# Patient Record
Sex: MALE | Race: BLACK OR AFRICAN AMERICAN | NOT HISPANIC OR LATINO | Employment: FULL TIME | URBAN - METROPOLITAN AREA
[De-identification: names, ages, dates, MRNs, and addresses within clinical notes are randomized per-mention and may not be internally consistent; named-entity substitution may affect disease eponyms.]

---

## 2020-07-19 ENCOUNTER — HOSPITAL ENCOUNTER (EMERGENCY)
Facility: HOSPITAL | Age: 25
Discharge: HOME | End: 2020-07-19
Attending: EMERGENCY MEDICINE | Admitting: EMERGENCY MEDICINE

## 2020-07-19 VITALS
RESPIRATION RATE: 14 BRPM | HEIGHT: 73 IN | BODY MASS INDEX: 27.17 KG/M2 | WEIGHT: 205.03 LBS | DIASTOLIC BLOOD PRESSURE: 76 MMHG | SYSTOLIC BLOOD PRESSURE: 113 MMHG | HEART RATE: 90 BPM | TEMPERATURE: 98 F | OXYGEN SATURATION: 95 %

## 2020-07-19 DIAGNOSIS — R00.2 PALPITATIONS: Primary | ICD-10-CM

## 2020-07-19 LAB
ANION GAP SERPL CALCULATED.3IONS-SCNC: 14 MMOL/L (ref 5–15)
BASOPHILS # BLD AUTO: 0 10*3/MM3 (ref 0–0.2)
BASOPHILS NFR BLD AUTO: 0.2 % (ref 0–1.5)
BILIRUB UR QL STRIP: NEGATIVE
BUN SERPL-MCNC: 14 MG/DL (ref 6–20)
BUN SERPL-MCNC: NORMAL MG/DL
BUN/CREAT SERPL: NORMAL
CALCIUM SPEC-SCNC: 9.1 MG/DL (ref 8.6–10.5)
CHLORIDE SERPL-SCNC: 102 MMOL/L (ref 98–107)
CLARITY UR: CLEAR
CO2 SERPL-SCNC: 24 MMOL/L (ref 22–29)
COLOR UR: YELLOW
CREAT SERPL-MCNC: 1.21 MG/DL (ref 0.76–1.27)
DEPRECATED RDW RBC AUTO: 38.1 FL (ref 37–54)
EOSINOPHIL # BLD AUTO: 0.1 10*3/MM3 (ref 0–0.4)
EOSINOPHIL NFR BLD AUTO: 0.7 % (ref 0.3–6.2)
ERYTHROCYTE [DISTWIDTH] IN BLOOD BY AUTOMATED COUNT: 12.6 % (ref 12.3–15.4)
GFR SERPL CREATININE-BSD FRML MDRD: 89 ML/MIN/1.73
GLUCOSE SERPL-MCNC: 91 MG/DL (ref 65–99)
GLUCOSE UR STRIP-MCNC: NEGATIVE MG/DL
HCT VFR BLD AUTO: 44.6 % (ref 37.5–51)
HGB BLD-MCNC: 14.8 G/DL (ref 13–17.7)
HGB UR QL STRIP.AUTO: NEGATIVE
KETONES UR QL STRIP: NEGATIVE
LEUKOCYTE ESTERASE UR QL STRIP.AUTO: NEGATIVE
LYMPHOCYTES # BLD AUTO: 1.5 10*3/MM3 (ref 0.7–3.1)
LYMPHOCYTES NFR BLD AUTO: 16.2 % (ref 19.6–45.3)
MAGNESIUM SERPL-MCNC: 1.8 MG/DL (ref 1.6–2.6)
MCH RBC QN AUTO: 28.4 PG (ref 26.6–33)
MCHC RBC AUTO-ENTMCNC: 33.1 G/DL (ref 31.5–35.7)
MCV RBC AUTO: 86 FL (ref 79–97)
MONOCYTES # BLD AUTO: 0.6 10*3/MM3 (ref 0.1–0.9)
MONOCYTES NFR BLD AUTO: 6.1 % (ref 5–12)
NEUTROPHILS NFR BLD AUTO: 7.2 10*3/MM3 (ref 1.7–7)
NEUTROPHILS NFR BLD AUTO: 76.8 % (ref 42.7–76)
NITRITE UR QL STRIP: NEGATIVE
NRBC BLD AUTO-RTO: 0.1 /100 WBC (ref 0–0.2)
PH UR STRIP.AUTO: 7 [PH] (ref 5–8)
PLATELET # BLD AUTO: 210 10*3/MM3 (ref 140–450)
PMV BLD AUTO: 8.6 FL (ref 6–12)
POTASSIUM SERPL-SCNC: 3.9 MMOL/L (ref 3.5–5.2)
PROT UR QL STRIP: NEGATIVE
RBC # BLD AUTO: 5.19 10*6/MM3 (ref 4.14–5.8)
SODIUM SERPL-SCNC: 140 MMOL/L (ref 136–145)
SP GR UR STRIP: <=1.005 (ref 1–1.03)
TROPONIN T SERPL-MCNC: <0.01 NG/ML (ref 0–0.03)
TSH SERPL DL<=0.05 MIU/L-ACNC: 1.32 UIU/ML (ref 0.27–4.2)
UROBILINOGEN UR QL STRIP: NORMAL
WBC # BLD AUTO: 9.4 10*3/MM3 (ref 3.4–10.8)

## 2020-07-19 PROCEDURE — 84484 ASSAY OF TROPONIN QUANT: CPT | Performed by: EMERGENCY MEDICINE

## 2020-07-19 PROCEDURE — 81003 URINALYSIS AUTO W/O SCOPE: CPT | Performed by: EMERGENCY MEDICINE

## 2020-07-19 PROCEDURE — 80048 BASIC METABOLIC PNL TOTAL CA: CPT | Performed by: EMERGENCY MEDICINE

## 2020-07-19 PROCEDURE — 93005 ELECTROCARDIOGRAM TRACING: CPT | Performed by: EMERGENCY MEDICINE

## 2020-07-19 PROCEDURE — 85025 COMPLETE CBC W/AUTO DIFF WBC: CPT | Performed by: EMERGENCY MEDICINE

## 2020-07-19 PROCEDURE — 83735 ASSAY OF MAGNESIUM: CPT | Performed by: EMERGENCY MEDICINE

## 2020-07-19 PROCEDURE — 84443 ASSAY THYROID STIM HORMONE: CPT | Performed by: EMERGENCY MEDICINE

## 2020-07-19 PROCEDURE — 99284 EMERGENCY DEPT VISIT MOD MDM: CPT

## 2020-07-19 NOTE — ED PROVIDER NOTES
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  These are nonexertional in nature.  Recurrence tonight briefly at work, subsequently resolved.  Patient works at [employer].  Patient denies any recent illness, no stimulant use or recreational drug use.  No associated chest pain or shortness of air.  Patient runs 3 to 4 miles several days a week without any symptoms.  Patient is followed in [place] by a cardiologist and has annual EKGs as well as echocardiograms and there has been no complications.          Review of Systems   Cardiovascular: Positive for palpitations.   Neurological: Positive for light-headedness.   Psychiatric/Behavioral: The patient is nervous/anxious.    All other systems reviewed and are negative.      No past medical history on file.    No Known Allergies    No past surgical history on file.    No family history on file.    Social History     Socioeconomic History   • Marital status: Single     Spouse name: Not on file   • Number of children: Not on file   • Years of education: Not on file   • Highest education level: Not on file           Objective   Physical Exam   Constitutional: He is oriented to person, place, and time. He appears well-developed and well-nourished.   HENT:   Head: Normocephalic and atraumatic.   Mouth/Throat: Oropharynx is clear and moist.   Eyes: Pupils are equal, round, and reactive to light. Conjunctivae are normal.   Neck: Normal range of motion. Neck supple.   Cardiovascular: Normal heart sounds and intact distal pulses.   Tachycardic, no murmur   Pulmonary/Chest: Effort normal and breath sounds normal.   Abdominal: Soft. Bowel sounds are normal. He exhibits no distension. There is no tenderness.   Musculoskeletal: Normal range of motion. He exhibits no edema or tenderness.   Neurological: He is alert and oriented to person, place, and time.   Skin:  Skin is warm and dry. Capillary refill takes less than 2 seconds.   Psychiatric:   Mildly anxious, otherwise appropriate and pleasant       Procedures           ED Course  ED Course as of Jul 19 0307   Sun Jul 19, 2020   0125 EKG interpretation: Sinus tachycardia, rate 104, nonspecific T wave inversion seen    [JR]      ED Course User Index  [JR] Mansoor Mendez MD                                           MDM  Number of Diagnoses or Management Options  Palpitations:   Diagnosis management comments: Results for orders placed or performed during the hospital encounter of 07/19/20  -Troponin       Result                      Value             Ref Range           Troponin T                  <0.010            0.000 - 0.03*  -Urinalysis With Microscopic If Indicated (No Culture) - Urine, Clean Catch       Result                      Value             Ref Range           Color, UA                   Yellow            Yellow, Straw       Appearance, UA              Clear             Clear               pH, UA                      7.0               5.0 - 8.0           Specific Gravity, UA        <=1.005           1.005 - 1.030       Glucose, UA                 Negative          Negative            Ketones, UA                 Negative          Negative            Bilirubin, UA               Negative          Negative            Blood, UA                   Negative          Negative            Protein, UA                 Negative          Negative            Leuk Esterase, UA           Negative          Negative            Nitrite, UA                 Negative          Negative            Urobilinogen, UA            0.2 E.U./dL       0.2 - 1.0 E.*  -Magnesium       Result                      Value             Ref Range           Magnesium                   1.8               1.6 - 2.6 mg*  -TSH       Result                      Value             Ref Range           TSH                         1.320             0.270 -  4.20*  -Basic Metabolic Panel       Result                      Value             Ref Range           Glucose                     91                65 - 99 mg/dL       BUN                                                               Creatinine                  1.21              0.76 - 1.27 *       Sodium                      140               136 - 145 mm*       Potassium                   3.9               3.5 - 5.2 mm*       Chloride                    102               98 - 107 mmo*       CO2                         24.0              22.0 - 29.0 *       Calcium                     9.1               8.6 - 10.5 m*       eGFR   Amer          89                >60 mL/min/1*       BUN/Creatinine Ratio                                              Anion Gap                   14.0              5.0 - 15.0 m*  -CBC Auto Differential       Result                      Value             Ref Range           WBC                         9.40              3.40 - 10.80*       RBC                         5.19              4.14 - 5.80 *       Hemoglobin                  14.8              13.0 - 17.7 *       Hematocrit                  44.6              37.5 - 51.0 %       MCV                         86.0              79.0 - 97.0 *       MCH                         28.4              26.6 - 33.0 *       MCHC                        33.1              31.5 - 35.7 *       RDW                         12.6              12.3 - 15.4 %       RDW-SD                      38.1              37.0 - 54.0 *       MPV                         8.6               6.0 - 12.0 fL       Platelets                   210               140 - 450 10*       Neutrophil %                76.8 (H)          42.7 - 76.0 %       Lymphocyte %                16.2 (L)          19.6 - 45.3 %       Monocyte %                  6.1               5.0 - 12.0 %        Eosinophil %                0.7               0.3 - 6.2 %         Basophil %                  0.2                0.0 - 1.5 %         Neutrophils, Absolute       7.20 (H)          1.70 - 7.00 *       Lymphocytes, Absolute       1.50              0.70 - 3.10 *       Monocytes, Absolute         0.60              0.10 - 0.90 *       Eosinophils, Absolute       0.10              0.00 - 0.40 *       Basophils, Absolute         0.00              0.00 - 0.20 *       nRBC                        0.1               0.0 - 0.2 /1*  -BUN       Result                      Value             Ref Range           BUN                         14                6 - 20 mg/dL     Well, vital signs stable, asymptomatic at present.  Given cardiac history, will place Holter monitor, patient has cardiologist to follow-up with after completed.       Amount and/or Complexity of Data Reviewed  Clinical lab tests: reviewed        Final diagnoses:   Palpitations            Mansoor Mendez MD  07/19/20 7985

## 2020-07-20 ENCOUNTER — HOSPITAL ENCOUNTER (OUTPATIENT)
Dept: RESPIRATORY THERAPY | Facility: HOSPITAL | Age: 25
Discharge: HOME | End: 2020-07-20
Admitting: EMERGENCY MEDICINE

## 2020-07-20 DIAGNOSIS — R00.2 PALPITATIONS: ICD-10-CM

## 2020-07-20 PROCEDURE — 93225 XTRNL ECG REC<48 HRS REC: CPT

## 2020-07-21 PROCEDURE — 93227 XTRNL ECG REC<48 HR R&I: CPT | Performed by: INTERNAL MEDICINE

## 2020-07-30 ENCOUNTER — OFFICE VISIT (OUTPATIENT)
Dept: CARDIOLOGY | Facility: CLINIC | Age: 25
End: 2020-07-30

## 2020-07-30 VITALS
BODY MASS INDEX: 26.91 KG/M2 | OXYGEN SATURATION: 98 % | HEART RATE: 82 BPM | DIASTOLIC BLOOD PRESSURE: 77 MMHG | SYSTOLIC BLOOD PRESSURE: 148 MMHG | WEIGHT: 204 LBS

## 2020-07-30 DIAGNOSIS — F41.8 ANXIETY ABOUT HEALTH: ICD-10-CM

## 2020-07-30 DIAGNOSIS — Z87.74 H/O REPAIR OF PATENT DUCTUS ARTERIOSUS: ICD-10-CM

## 2020-07-30 DIAGNOSIS — I49.3 SYMPTOMATIC PVCS: Primary | ICD-10-CM

## 2020-07-30 DIAGNOSIS — Q25.1 COARCTATION OF AORTA: ICD-10-CM

## 2020-07-30 PROCEDURE — 99203 OFFICE O/P NEW LOW 30 MIN: CPT | Performed by: NURSE PRACTITIONER

## 2020-08-03 PROBLEM — Q25.1 COARCTATION OF AORTA: Status: ACTIVE | Noted: 2020-08-03

## 2020-08-03 PROBLEM — Z87.74 H/O REPAIR OF PATENT DUCTUS ARTERIOSUS: Status: ACTIVE | Noted: 2020-08-03

## 2020-08-03 PROBLEM — F41.8 ANXIETY ABOUT HEALTH: Status: ACTIVE | Noted: 2020-08-03

## 2020-08-03 PROBLEM — I49.3 SYMPTOMATIC PVCS: Status: ACTIVE | Noted: 2020-08-03

## 2020-08-04 ENCOUNTER — APPOINTMENT (OUTPATIENT)
Dept: CARDIOLOGY | Facility: HOSPITAL | Age: 25
End: 2020-08-04

## 2020-08-05 ENCOUNTER — HOSPITAL ENCOUNTER (OUTPATIENT)
Dept: CARDIOLOGY | Facility: HOSPITAL | Age: 25
Discharge: HOME | End: 2020-08-05
Admitting: NURSE PRACTITIONER

## 2020-08-05 DIAGNOSIS — Q25.1 COARCTATION OF AORTA: ICD-10-CM

## 2020-08-05 DIAGNOSIS — I49.3 SYMPTOMATIC PVCS: ICD-10-CM

## 2020-08-05 PROCEDURE — 93016 CV STRESS TEST SUPVJ ONLY: CPT | Performed by: INTERNAL MEDICINE

## 2020-08-05 PROCEDURE — 93017 CV STRESS TEST TRACING ONLY: CPT

## 2020-08-06 ENCOUNTER — OFFICE VISIT (OUTPATIENT)
Dept: CARDIOLOGY | Facility: CLINIC | Age: 25
End: 2020-08-06

## 2020-08-06 DIAGNOSIS — I49.3 SYMPTOMATIC PVCS: ICD-10-CM

## 2020-08-06 DIAGNOSIS — F41.8 ANXIETY ABOUT HEALTH: ICD-10-CM

## 2020-08-06 DIAGNOSIS — Q25.1 COARCTATION OF AORTA: Primary | ICD-10-CM

## 2020-08-06 DIAGNOSIS — Z87.74 H/O REPAIR OF PATENT DUCTUS ARTERIOSUS: ICD-10-CM

## 2020-08-06 LAB
BH CV STRESS BP STAGE 1: NORMAL
BH CV STRESS BP STAGE 2: NORMAL
BH CV STRESS BP STAGE 3: NORMAL
BH CV STRESS BP STAGE 4: NORMAL
BH CV STRESS BP STAGE 5: NORMAL
BH CV STRESS DURATION MIN STAGE 1: 3
BH CV STRESS DURATION MIN STAGE 2: 3
BH CV STRESS DURATION MIN STAGE 3: 3
BH CV STRESS DURATION MIN STAGE 4: 3
BH CV STRESS DURATION MIN STAGE 5: 0
BH CV STRESS DURATION SEC STAGE 1: 0
BH CV STRESS DURATION SEC STAGE 2: 0
BH CV STRESS DURATION SEC STAGE 3: 0
BH CV STRESS DURATION SEC STAGE 4: 0
BH CV STRESS DURATION SEC STAGE 5: 53
BH CV STRESS GRADE STAGE 1: 10
BH CV STRESS GRADE STAGE 2: 12
BH CV STRESS GRADE STAGE 3: 14
BH CV STRESS GRADE STAGE 4: 16
BH CV STRESS GRADE STAGE 5: 18
BH CV STRESS HR STAGE 1: 116
BH CV STRESS HR STAGE 2: 153
BH CV STRESS HR STAGE 3: 178
BH CV STRESS HR STAGE 4: 186
BH CV STRESS HR STAGE 5: 188
BH CV STRESS METS STAGE 1: 5
BH CV STRESS METS STAGE 2: 7.5
BH CV STRESS METS STAGE 3: 10
BH CV STRESS METS STAGE 4: 13.5
BH CV STRESS METS STAGE 5: 14.8
BH CV STRESS PROTOCOL 1: NORMAL
BH CV STRESS RECOVERY BP: NORMAL MMHG
BH CV STRESS RECOVERY HR: 137 BPM
BH CV STRESS SPEED STAGE 1: 1.7
BH CV STRESS SPEED STAGE 2: 2.5
BH CV STRESS SPEED STAGE 3: 3.4
BH CV STRESS SPEED STAGE 4: 4.2
BH CV STRESS SPEED STAGE 5: 5
BH CV STRESS STAGE 1: 1
BH CV STRESS STAGE 2: 2
BH CV STRESS STAGE 3: 3
BH CV STRESS STAGE 4: 4
BH CV STRESS STAGE 5: 5
MAXIMAL PREDICTED HEART RATE: 195 BPM
PERCENT MAX PREDICTED HR: 96.41 %
STRESS BASELINE BP: NORMAL MMHG
STRESS BASELINE HR: 82 BPM
STRESS PERCENT HR: 113 %
STRESS POST ESTIMATED WORKLOAD: 14.8 METS
STRESS POST EXERCISE DUR MIN: 12 MIN
STRESS POST EXERCISE DUR SEC: 53 SEC
STRESS POST PEAK BP: NORMAL MMHG
STRESS POST PEAK HR: 188 BPM
STRESS TARGET HR: 166 BPM

## 2020-08-06 PROCEDURE — 99213 OFFICE O/P EST LOW 20 MIN: CPT | Performed by: NURSE PRACTITIONER

## 2020-08-06 PROCEDURE — 93018 CV STRESS TEST I&R ONLY: CPT | Performed by: INTERNAL MEDICINE

## 2020-08-06 RX ORDER — AMLODIPINE BESYLATE 2.5 MG/1
2.5 TABLET ORAL DAILY
Qty: 90 TABLET | Refills: 1 | Status: SHIPPED | OUTPATIENT
Start: 2020-08-06

## 2020-08-08 VITALS
DIASTOLIC BLOOD PRESSURE: 102 MMHG | SYSTOLIC BLOOD PRESSURE: 166 MMHG | BODY MASS INDEX: 26.52 KG/M2 | WEIGHT: 201 LBS | HEART RATE: 107 BPM

## 2020-08-08 NOTE — PROGRESS NOTES
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   Patient was seen in the emergency department 7/19/2020 and reports 1 year history of intermittent palpitations.   These occur on a daily basis, 1-2 episodes per day over the past 1.5-2 years.  Patient states it feels like \"an extra beat\".  He denies any associated symptoms such as chest pain, pressure, tightness, dizziness, lightheadedness, presyncopal or syncopal episodes.   The patient is very active and runs several times a week 3 to 4 miles with no symptoms.  He stated the episodes  occurred briefly at work the day of ER presentation and subsequently resolved.  Patient works at [employer].    He denies any significant caffeine intake.  No stimulant use or recreational drug use.  Patient is followed in [place] by a cardiologist at [facility] and has annual EKGs, echocardiograms and intermittent cardiac MRI/MRA.  He stated there have been no complications.  He presented initially in follow-up from ER visit.  He stated he does feel quite anxious about his symptoms given his past cardiac history.  Patient had 48-hour Holter monitor ordered per ER physician that showed rare premature ventricular contractions.  No episodes of SVT, VT, atrial arrhythmias or pauses. Predominant rhythm was sinus.  Patient has copy of his most recent MRA: Left aortic arch with coarctation repair.  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  Peak velocity through this " region is 238.4 cm/s. There is some mild poststenotic dilatation and then the aorta gradually tapers toward the level of the diaphragm aorta.  Patient was scheduled for treadmill stress testing.  He ambulated 12 minutes obtaining 100% of maximum predicted heart rate value with hypertensive blood pressure response at 236/46.  He presents today in follow-up for treadmill.    Today, patient has no change in symptoms.  Again, he is very anxious about his symptoms, heart condition and prognosis.  Case was discussed with cardiologist-Dr. Thad Sexton-with recommendation for low-dose antihypertensive.  Patient is moving to [place], [place] in 3 days to live with his parents.      Assessment:  History of coarctation of the aorta status post surgical repair  History of PDA status post surgical repair  Palpitations  Anxiety  Abnormal treadmill stress test with hypertensive BP response    Plan:  Start Norvasc 2.5mg po qd  Pt. Advised to contact [facility] for adult congenital cardiology care and F/U there ASAP        The following portions of the patient's history were reviewed and updated as appropriate: allergies, current medications, past family history, past medical history, past social history, past surgical history and problem list.    REVIEW OF SYSTEMS:    Review of Systems   Cardiovascular: Positive for palpitations.   Psychiatric/Behavioral: The patient is nervous/anxious.    All other systems reviewed and are negative.      Vitals:    08/08/20 1537   BP: (!) 166/102   Pulse:          PHYSICAL EXAM:    General: Well-developed, well-nourished, physically fit appearing 25-year-old male who is alert, cooperative, no distress, appears stated age  Head:  Normocephalic, atraumatic, mucous membranes moist  Eyes:  Conjunctiva/corneas clear, EOM's intact     Neck:  Supple,  no JVD or bruit     Lungs: Clear to auscultation bilaterally, no wheezes rhonchi rales are noted  Chest wall: No  "tenderness  Musculoskeletal:   Ambulates freely without assistance  Heart::  Regular rate and rhythm, S1 and S2 normal, no murmur, rub or gallop  Abdomen: Soft, non-tender, nondistended, bowel sounds active, no abdominal bruit  Extremities: No cyanosis, clubbing, or edema   Pulses: 2+ and symmetric all extremities  Skin:  No rashes or lesions  Neuro/psych: A&O x3. CN II through XII are grossly intact with appropriate affect        History reviewed. No pertinent past medical history.    Past Surgical History:   Procedure Laterality Date   • AORTIC VALVE SURGERY           Current Outpatient Medications:   •  amLODIPine (NORVASC) 2.5 MG tablet, Take 1 tablet by mouth Daily., Disp: 90 tablet, Rfl: 1    No Known Allergies    History reviewed. No pertinent family history.    Social History     Tobacco Use   • Smoking status: Never Smoker   • Smokeless tobacco: Never Used   Substance Use Topics   • Alcohol use: Yes     Comment: rarely            Current Electrocardiogram:  Procedures      Natalie Mckenzie, APRN  08/08/20  15:38      EMR Dragon/Transcription:   \"Dictated utilizing Dragon dictation\".         "